# Patient Record
Sex: MALE | Race: WHITE | Employment: FULL TIME
[De-identification: names, ages, dates, MRNs, and addresses within clinical notes are randomized per-mention and may not be internally consistent; named-entity substitution may affect disease eponyms.]

---

## 2023-02-28 ENCOUNTER — APPOINTMENT (OUTPATIENT)
Facility: HOSPITAL | Age: 28
End: 2023-02-28

## 2023-02-28 ENCOUNTER — HOSPITAL ENCOUNTER (EMERGENCY)
Facility: HOSPITAL | Age: 28
Discharge: HOME | End: 2023-02-28
Attending: EMERGENCY MEDICINE | Admitting: EMERGENCY MEDICINE

## 2023-02-28 VITALS
BODY MASS INDEX: 42.66 KG/M2 | RESPIRATION RATE: 18 BRPM | WEIGHT: 315 LBS | OXYGEN SATURATION: 100 % | HEART RATE: 90 BPM | TEMPERATURE: 98 F | HEIGHT: 72 IN | DIASTOLIC BLOOD PRESSURE: 91 MMHG | SYSTOLIC BLOOD PRESSURE: 145 MMHG

## 2023-02-28 DIAGNOSIS — S69.92XA INJURY OF LEFT HAND, INITIAL ENCOUNTER: Primary | ICD-10-CM

## 2023-02-28 DIAGNOSIS — R93.6 ABNORMAL X-RAY OF HAND: ICD-10-CM

## 2023-02-28 DIAGNOSIS — T14.8XXA ABRASION: ICD-10-CM

## 2023-02-28 PROCEDURE — 6370000000 HC RX 637 (ALT 250 FOR IP): Performed by: PHYSICIAN ASSISTANT

## 2023-02-28 PROCEDURE — 99283 EMERGENCY DEPT VISIT LOW MDM: CPT

## 2023-02-28 PROCEDURE — 73130 X-RAY EXAM OF HAND: CPT

## 2023-02-28 RX ORDER — ACETAMINOPHEN 325 MG/1
650 TABLET ORAL
Status: COMPLETED | OUTPATIENT
Start: 2023-02-28 | End: 2023-02-28

## 2023-02-28 RX ORDER — IBUPROFEN 600 MG/1
600 TABLET ORAL
Status: COMPLETED | OUTPATIENT
Start: 2023-02-28 | End: 2023-02-28

## 2023-02-28 RX ADMIN — IBUPROFEN 600 MG: 600 TABLET ORAL at 12:00

## 2023-02-28 RX ADMIN — ACETAMINOPHEN 650 MG: 325 TABLET ORAL at 12:40

## 2023-02-28 ASSESSMENT — PAIN SCALES - GENERAL
PAINLEVEL_OUTOF10: 8
PAINLEVEL_OUTOF10: 5
PAINLEVEL_OUTOF10: 6

## 2023-02-28 ASSESSMENT — PAIN - FUNCTIONAL ASSESSMENT: PAIN_FUNCTIONAL_ASSESSMENT: 0-10

## 2023-02-28 ASSESSMENT — PAIN DESCRIPTION - LOCATION: LOCATION: HAND

## 2023-02-28 ASSESSMENT — PAIN DESCRIPTION - ORIENTATION: ORIENTATION: LEFT

## 2023-02-28 NOTE — ED PROVIDER NOTES
EMERGENCY DEPARTMENT HISTORY AND PHYSICAL EXAM      Patient Name: Marc Lucero  MRN: [id number]  YOB: 1995  Provider: CARLA Orona  PCP: None None   Time/Date of evaluation: 11:12 AM EST on 2/28/23    History of Presenting Illness     Chief Complaint   Patient presents with    Hand Injury     History Provided By: Patient     History Yamilex Felipe):   Marc Lucero is a 32 y.o. male presents the ED complaining of left hand injury prior to arrival.  Patient states he was using a  while at work when the  exploded, injuring his left hand. He notes having constant mild aching pain with surrounding swelling. Patient states he is up-to-date on his tetanus shot. Has full range of motion of his left hand. Denies any other injury, numbness or weakness, other symptoms. Past History     Past Medical History:  Obesity     Past Surgical History:  No past surgical history on file. Family History:  No family history on file. Social History:       Medications:  No current facility-administered medications for this encounter. No current outpatient medications on file. Allergies: Allergies   Allergen Reactions    Penicillins Hives    Sulfa Antibiotics      unsure       Social Determinants of Health:  Social Determinants of Health     Tobacco Use: Not on file   Alcohol Use: Not on file   Financial Resource Strain: Not on file   Food Insecurity: Not on file   Transportation Needs: Not on file   Physical Activity: Not on file   Stress: Not on file   Social Connections: Not on file   Intimate Partner Violence: Not on file   Depression: Not on file   Housing Stability: Not on file       Review of Systems     Negative except as listed above in HPI.     Physical Exam     Vitals:    02/28/23 1028 02/28/23 1242   BP: (!) 151/103 (!) 145/91   Pulse: 94 90   Resp: 16 18   Temp: 98.1 °F (36.7 °C) 98 °F (36.7 °C)   TempSrc: Oral Oral   SpO2: 99% 100%   Weight: (!) 315 lb (142.9 kg) Height: 6' (1.829 m)        Constitutional: Non-toxic appearing, in no acute distress  Head: Normocephalic, Atraumatic  Neck: Supple  Cardiovascular: Regular rate and rhythm, no murmurs, rubs, or gallops  Chest: Normal work of breathing and chest excursion bilaterally  Lungs: Clear to ausculation bilaterally  Abdomen: Soft, non tender, non distended, normoactive bowel sounds  Back: No evidence of trauma or deformity  Extremities: Edema noted to left dorsal hand with overlying tenderness palpation, 5 out of 5 strength and neurovascular intact distally. Skin: Superficial abrasion noted to dorsum of left hand, approximately 2 x 3 cm. Neuro: Alert and appropriate, CN intact, normal speech, strength and sensation full and symmetric bilaterally, normal gait, normal coordination  Psychiatric: Normal mood and affect     Diagnostic Study Results     Radiologic Studies:   XR HAND LEFT (MIN 3 VIEWS)   Final Result      Soft tissue swelling without soft tissue gas or acute osseous abnormality. Medical Decision Making     CC/HPI Summary, DDx, ED Course, and Reassessment: Patient presents with left hand pain after a  exploded, hitting him in the left hand. He has a superficial abrasion to the left dorsal hand, edema to the site as well as tenderness palpation. X-ray shows no acute process as read by myself. Wound was cleaned, bacitracin and a dressing were applied. Patient was counseled on performing good wound care at home. He has not a diabetic. Given Tylenol and ibuprofen in the ED. Directed to take over-the-counter ibuprofen at home. Patient may follow-up with hand specialist, provided with information to do so. Patient is up-to-date on tetanus. No scaphoid tenderness, doubt any fracture. Diagnosis and Disposition     DIAGNOSIS:   1. Injury of left hand, initial encounter    2. Abrasion    3.  Abnormal x-ray of hand        PATIENT REFERRED TO:  Johnathon Briceno DO  [address] [facility] [address] [facility]    Schedule an appointment as soon as possible for a visit       [facility] EMERGENCY DEPT  [address]  [phone]    If symptoms worsen       (Please note that portions of this note were completed with a voice recognition program.  Efforts were made to edit the dictations but occasionally words are mis-transcribed.)    CARLA Camara (electronically signed)      Dragon Disclaimer     Please note that this dictation was completed with [employer], the computer voice recognition software. Quite often unanticipated grammatical, syntax, homophones, and other interpretive errors are inadvertently transcribed by the computer software. Please disregard these errors. Please excuse any errors that have escaped final proofreading.        CARLA Camara  02/28/23 [id number]

## 2023-02-28 NOTE — ED NOTES
All discharge paperwork reviewed with patient and he denies any need for further explanation regarding these instructions.  Denies need for wheelchair and ambulates out of ED     Hannah Salinas RN  02/28/23 [id number]

## 2023-02-28 NOTE — ED TRIAGE NOTES
Pt arrives to ED c/o L hand injury from work. Pt states a rock  wheel \"exploded\". Puncture wound noted to L hand. Bleeding controlled. Tetanus UTD, 05/2022.